# Patient Record
Sex: FEMALE | Race: OTHER | NOT HISPANIC OR LATINO | URBAN - METROPOLITAN AREA
[De-identification: names, ages, dates, MRNs, and addresses within clinical notes are randomized per-mention and may not be internally consistent; named-entity substitution may affect disease eponyms.]

---

## 2023-11-14 DIAGNOSIS — E83.119 HEMOCHROMATOSIS, UNSPECIFIED HEMOCHROMATOSIS TYPE: ICD-10-CM

## 2023-11-14 RX ORDER — TOPIRAMATE 100 MG/1
100 TABLET, FILM COATED ORAL 2 TIMES DAILY
Qty: 120 TABLET | Refills: 3 | Status: SHIPPED | OUTPATIENT
Start: 2023-11-14 | End: 2024-04-17 | Stop reason: SDUPTHER

## 2023-11-14 RX ORDER — TOPIRAMATE 100 MG/1
100 TABLET, FILM COATED ORAL 2 TIMES DAILY
COMMUNITY
End: 2023-11-14 | Stop reason: SDUPTHER

## 2024-04-17 ENCOUNTER — LAB (OUTPATIENT)
Dept: LAB | Facility: CLINIC | Age: 54
End: 2024-04-17
Payer: COMMERCIAL

## 2024-04-17 ENCOUNTER — OFFICE VISIT (OUTPATIENT)
Dept: HEMATOLOGY/ONCOLOGY | Facility: CLINIC | Age: 54
End: 2024-04-17
Payer: COMMERCIAL

## 2024-04-17 VITALS
HEART RATE: 83 BPM | WEIGHT: 182.98 LBS | OXYGEN SATURATION: 94 % | DIASTOLIC BLOOD PRESSURE: 65 MMHG | RESPIRATION RATE: 20 BRPM | SYSTOLIC BLOOD PRESSURE: 111 MMHG | TEMPERATURE: 97.5 F | BODY MASS INDEX: 34.15 KG/M2

## 2024-04-17 DIAGNOSIS — E83.119 HEMOCHROMATOSIS, UNSPECIFIED HEMOCHROMATOSIS TYPE: ICD-10-CM

## 2024-04-17 DIAGNOSIS — E83.119 HEMOCHROMATOSIS, UNSPECIFIED HEMOCHROMATOSIS TYPE: Primary | ICD-10-CM

## 2024-04-17 LAB
ALBUMIN SERPL BCP-MCNC: 4.2 G/DL (ref 3.4–5)
ALP SERPL-CCNC: 60 U/L (ref 33–110)
ALT SERPL W P-5'-P-CCNC: 19 U/L (ref 7–45)
ANION GAP SERPL CALC-SCNC: 12 MMOL/L (ref 10–20)
AST SERPL W P-5'-P-CCNC: 20 U/L (ref 9–39)
BASOPHILS # BLD AUTO: 0.03 X10*3/UL (ref 0–0.1)
BASOPHILS NFR BLD AUTO: 0.4 %
BILIRUB SERPL-MCNC: 0.4 MG/DL (ref 0–1.2)
BUN SERPL-MCNC: 14 MG/DL (ref 6–23)
CALCIUM SERPL-MCNC: 9.3 MG/DL (ref 8.6–10.3)
CHLORIDE SERPL-SCNC: 104 MMOL/L (ref 98–107)
CO2 SERPL-SCNC: 28 MMOL/L (ref 21–32)
CREAT SERPL-MCNC: 0.8 MG/DL (ref 0.5–1.05)
EGFRCR SERPLBLD CKD-EPI 2021: 87 ML/MIN/1.73M*2
EOSINOPHIL # BLD AUTO: 0.17 X10*3/UL (ref 0–0.7)
EOSINOPHIL NFR BLD AUTO: 2.4 %
ERYTHROCYTE [DISTWIDTH] IN BLOOD BY AUTOMATED COUNT: 12.4 % (ref 11.5–14.5)
GLUCOSE SERPL-MCNC: 82 MG/DL (ref 74–99)
HCT VFR BLD AUTO: 38.6 % (ref 36–46)
HGB BLD-MCNC: 13.2 G/DL (ref 12–16)
IMM GRANULOCYTES # BLD AUTO: 0.02 X10*3/UL (ref 0–0.7)
IMM GRANULOCYTES NFR BLD AUTO: 0.3 % (ref 0–0.9)
IRON SATN MFR SERPL: 23 % (ref 25–45)
IRON SERPL-MCNC: 93 UG/DL (ref 35–150)
LYMPHOCYTES # BLD AUTO: 2.9 X10*3/UL (ref 1.2–4.8)
LYMPHOCYTES NFR BLD AUTO: 41.3 %
MCH RBC QN AUTO: 31.2 PG (ref 26–34)
MCHC RBC AUTO-ENTMCNC: 34.2 G/DL (ref 32–36)
MCV RBC AUTO: 91 FL (ref 80–100)
MONOCYTES # BLD AUTO: 0.43 X10*3/UL (ref 0.1–1)
MONOCYTES NFR BLD AUTO: 6.1 %
NEUTROPHILS # BLD AUTO: 3.47 X10*3/UL (ref 1.2–7.7)
NEUTROPHILS NFR BLD AUTO: 49.5 %
NRBC BLD-RTO: 0 /100 WBCS (ref 0–0)
PLATELET # BLD AUTO: 210 X10*3/UL (ref 150–450)
POTASSIUM SERPL-SCNC: 3.9 MMOL/L (ref 3.5–5.3)
PROT SERPL-MCNC: 7.6 G/DL (ref 6.4–8.2)
RBC # BLD AUTO: 4.23 X10*6/UL (ref 4–5.2)
SODIUM SERPL-SCNC: 140 MMOL/L (ref 136–145)
TIBC SERPL-MCNC: 397 UG/DL (ref 240–445)
UIBC SERPL-MCNC: 304 UG/DL (ref 110–370)
WBC # BLD AUTO: 7 X10*3/UL (ref 4.4–11.3)

## 2024-04-17 PROCEDURE — 84075 ASSAY ALKALINE PHOSPHATASE: CPT

## 2024-04-17 PROCEDURE — 99213 OFFICE O/P EST LOW 20 MIN: CPT | Performed by: INTERNAL MEDICINE

## 2024-04-17 PROCEDURE — 83540 ASSAY OF IRON: CPT

## 2024-04-17 PROCEDURE — 36415 COLL VENOUS BLD VENIPUNCTURE: CPT

## 2024-04-17 PROCEDURE — 85025 COMPLETE CBC W/AUTO DIFF WBC: CPT

## 2024-04-17 PROCEDURE — 82728 ASSAY OF FERRITIN: CPT | Mod: PARLAB

## 2024-04-17 RX ORDER — ATORVASTATIN CALCIUM 10 MG/1
10 TABLET, FILM COATED ORAL DAILY
COMMUNITY

## 2024-04-17 RX ORDER — BUPROPION HYDROCHLORIDE 150 MG/1
150 TABLET, FILM COATED, EXTENDED RELEASE ORAL DAILY
Qty: 30 TABLET | Refills: 3 | Status: SHIPPED | OUTPATIENT
Start: 2024-04-17

## 2024-04-17 RX ORDER — TOPIRAMATE 100 MG/1
100 TABLET, FILM COATED ORAL 2 TIMES DAILY
Qty: 60 TABLET | Refills: 3 | Status: SHIPPED | OUTPATIENT
Start: 2024-04-17

## 2024-04-17 RX ORDER — BUPROPION HYDROCHLORIDE 150 MG/1
150 TABLET, FILM COATED, EXTENDED RELEASE ORAL DAILY
COMMUNITY
End: 2024-04-17 | Stop reason: SDUPTHER

## 2024-04-17 ASSESSMENT — PAIN SCALES - GENERAL: PAINLEVEL: 8

## 2024-04-17 NOTE — PROGRESS NOTES
LOCATION:    [facility].    HEMATOLOGY/ONCOLOGY  PROBLEM:    1. Hemochromatosis.      a. 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      b. Intermittent phlebotomy beginning May 2016.    CHIEF COMPLAINT:    The patient is in the clinic today for management of iron  overload/hemochromatosis.    HISTORY:    Ms. Guajardo is a 53-year-old pleasant  female with a finding of heterozygous HFE H63D mutation.   She  has problems with diffuse arthritis and related symptoms, and during the workup was noted to have elevated serum iron of 192 on 2016, TIBC was 359, and percentage iron saturation was 53.  CBC and metabolic profile as such were all normal.   Hematochromatosis genetic evaluation revealed heterozygous H63D mutation.  No specific previous personal or family history of hemochromatosis, iron overload, or similar disorders.  Her father  from liver and heart problems but was never tested for  hemachromatosis.  Her main problem is of diffuse arthritic symptoms for which she gets intermittent cortisol shots at different joints on regular basis at [facility] and is also being followed and pain management clinic. After initial hematological  evaluation she was started on phlebotomy with the aim of keeping the Ferritin level at 50 or below.      INTERVAL HISTORY:  She is complaining of problems with the persistent back pain, worsening bilateral lower extremity neuropathic pain.  She has gone through multiple courses of physical therapy and at times also had received intra-articular shots but not with much response.  Still has  issues with weight gain despite being very active and very careful with her diet and regular exercise. No history of nausea, vomiting, fever, night sweats, diarrhea, rash, anorexia.   Follow-up blood work from today are essentially normal.    PAST MEDICAL HISTORY:    1.  Degenerative joint disease.  2.  Bilateral carpal tunnel  surgery.  3.  Anxiety.  4.  History  of multiple intra-articular steroid injections.  5.  Fibromyalgia.     SOCIAL HISTORY:     and lives in [place].  Nonsmoker, nonalcoholic. Born and raised in [place],  but moved [place] the age of 36. She currently works at [employer].     FAMILY HISTORY:    Father  at age 72 from myocardial  infarction. Mother  from brain hemorrhage at age 74.  Three siblings, one of her sisters has hepatitis, and another one has heart problems.  5 children from ages 9 to 31, all alive and well.    OB/GYN HISTORY:    Premenopausal.  She G8, P5-A3.    REVIEW OF SYSTEMS:    Pertinent finding as per the history above.  All other systems have been reviewed and generally negative and noncontributory.    ALLERGY & MEDICATIONS:  Allergies and latest outpatient medications list were reviewed in the EMR.    VITAL SIGNS  BSA: 1.9 meters squared  /65   Pulse 83   Temp 36.4 °C (97.5 °F) (Temporal)   Resp 20   Wt 83 kg (182 lb 15.7 oz)   SpO2 94%   BMI 34.15 kg/m²     PHYSICAL EXAMINATION:  Detailed examination not done.    LAB RESULTS:  CBC from today showed a normal hemoglobin of 13.2 with MCV of 91.  WBC, platelets are normal.  Metabolic profile, ferritin and iron panel results are pending.     ASSESSMENT & PLAN:  1. Hemochromatosis.  Please refer to the details of initial presentation and management as outlined above.  In summary she was noticed to have elevated serum  iron with further testing confirming heterozygous HFE H63D mutation.  The presence of heterozygous H63D mutation is neither confirmatory nor rule out the diagnosis of hemochromatosis completely.  About 16% percent of the healthy  population carry  a heterozygous HFE H63D mutation.  I am not aware of the exact statistics about the incidence of the mutation in  population.  She is maintained on intermittent phlebotomy due to elevated serum iron with goal of keeping ferritin level at around   50 or below. Symptomatically she feels better after phlebotomy.    Blood work is stable and there in no need a phlebotomy today. Her last phlebotomy was in 2019.  For now we will continue to monitor closely with regular blood work and will do intermittent phlebotomy as needed.   We will continue to monitor her  closely with regular blood work.      2.   Weight gain / Back pain.  No clear etiology.  She is very diligent about her diet and is fairly active.  I advised her to follow-up with Dr. Potter to review the option of Ozempic and neurosurgery evaluations.     3.  Follow up:  Patient will return to the clinic in 12 months.  Advised to contact us immediately if there are any new questions or problems.     This note has been transcribed using Dragon voice recognition system and there is a possibility of unintentional typing misprints.

## 2024-04-18 LAB — FERRITIN SERPL-MCNC: 37 NG/ML (ref 8–150)

## 2024-08-05 ENCOUNTER — TELEPHONE (OUTPATIENT)
Dept: HEMATOLOGY/ONCOLOGY | Facility: CLINIC | Age: 54
End: 2024-08-05
Payer: COMMERCIAL

## 2024-08-05 DIAGNOSIS — E83.119 HEMOCHROMATOSIS, UNSPECIFIED HEMOCHROMATOSIS TYPE: ICD-10-CM

## 2024-08-05 RX ORDER — TOPIRAMATE 100 MG/1
100 TABLET, FILM COATED ORAL 2 TIMES DAILY
Qty: 60 TABLET | Refills: 3 | Status: SHIPPED | OUTPATIENT
Start: 2024-08-05

## 2024-08-05 RX ORDER — BUPROPION HYDROCHLORIDE 150 MG/1
150 TABLET, FILM COATED, EXTENDED RELEASE ORAL DAILY
Qty: 30 TABLET | Refills: 3 | Status: SHIPPED | OUTPATIENT
Start: 2024-08-05

## 2025-01-03 DIAGNOSIS — E83.119 HEMOCHROMATOSIS, UNSPECIFIED HEMOCHROMATOSIS TYPE: ICD-10-CM

## 2025-01-03 RX ORDER — BUPROPION HYDROCHLORIDE 150 MG/1
150 TABLET, FILM COATED, EXTENDED RELEASE ORAL DAILY
Qty: 30 TABLET | Refills: 3 | Status: SHIPPED | OUTPATIENT
Start: 2025-01-03

## 2025-01-03 RX ORDER — TOPIRAMATE 100 MG/1
100 TABLET, FILM COATED ORAL 2 TIMES DAILY
Qty: 60 TABLET | Refills: 3 | Status: SHIPPED | OUTPATIENT
Start: 2025-01-03

## 2025-01-16 DIAGNOSIS — E83.119 HEMOCHROMATOSIS, UNSPECIFIED HEMOCHROMATOSIS TYPE: ICD-10-CM

## 2025-01-16 RX ORDER — ATORVASTATIN CALCIUM 10 MG/1
10 TABLET, FILM COATED ORAL DAILY
Qty: 90 TABLET | Refills: 3 | Status: SHIPPED | OUTPATIENT
Start: 2025-01-16

## 2025-01-16 RX ORDER — BUPROPION HYDROCHLORIDE 150 MG/1
150 TABLET, FILM COATED, EXTENDED RELEASE ORAL DAILY
Qty: 90 TABLET | Refills: 3 | Status: SHIPPED | OUTPATIENT
Start: 2025-01-16

## 2025-01-16 RX ORDER — TOPIRAMATE 100 MG/1
100 TABLET, FILM COATED ORAL 2 TIMES DAILY
Qty: 180 TABLET | Refills: 3 | Status: SHIPPED | OUTPATIENT
Start: 2025-01-16

## 2025-01-25 DIAGNOSIS — E83.119 HEMOCHROMATOSIS, UNSPECIFIED HEMOCHROMATOSIS TYPE: ICD-10-CM

## 2025-01-25 RX ORDER — ATORVASTATIN CALCIUM 10 MG/1
10 TABLET, FILM COATED ORAL DAILY
Qty: 90 TABLET | Refills: 3 | Status: SHIPPED | OUTPATIENT
Start: 2025-01-25 | End: 2026-01-20

## 2025-01-25 RX ORDER — BUPROPION HYDROCHLORIDE 150 MG/1
150 TABLET, FILM COATED, EXTENDED RELEASE ORAL DAILY
Qty: 90 TABLET | Refills: 3 | Status: SHIPPED | OUTPATIENT
Start: 2025-01-25 | End: 2026-01-20

## 2025-01-25 RX ORDER — TOPIRAMATE 100 MG/1
100 TABLET, FILM COATED ORAL 2 TIMES DAILY
Qty: 180 TABLET | Refills: 0 | Status: SHIPPED | OUTPATIENT
Start: 2025-01-25 | End: 2025-04-25

## 2025-05-03 DIAGNOSIS — E83.119 HEMOCHROMATOSIS, UNSPECIFIED HEMOCHROMATOSIS TYPE: ICD-10-CM

## 2025-05-03 RX ORDER — TOPIRAMATE 100 MG/1
100 TABLET, FILM COATED ORAL 2 TIMES DAILY
Qty: 180 TABLET | Refills: 3 | Status: SHIPPED | OUTPATIENT
Start: 2025-05-03 | End: 2026-04-28